# Patient Record
Sex: FEMALE | NOT HISPANIC OR LATINO | Employment: UNEMPLOYED | URBAN - METROPOLITAN AREA
[De-identification: names, ages, dates, MRNs, and addresses within clinical notes are randomized per-mention and may not be internally consistent; named-entity substitution may affect disease eponyms.]

---

## 2018-05-07 ENCOUNTER — TRANSCRIBE ORDERS (OUTPATIENT)
Dept: ADMINISTRATIVE | Facility: HOSPITAL | Age: 68
End: 2018-05-07

## 2018-05-07 DIAGNOSIS — Z12.31 VISIT FOR SCREENING MAMMOGRAM: Primary | ICD-10-CM

## 2018-05-24 PROBLEM — E11.9 TYPE 2 DIABETES MELLITUS (HCC): Status: ACTIVE | Noted: 2018-05-24

## 2018-05-24 PROBLEM — E66.9 CLASS 1 OBESITY IN ADULT: Status: ACTIVE | Noted: 2018-05-24

## 2018-05-24 PROBLEM — R94.31 ABNORMAL EKG: Status: ACTIVE | Noted: 2018-05-24

## 2018-06-01 ENCOUNTER — APPOINTMENT (OUTPATIENT)
Dept: MAMMOGRAPHY | Facility: HOSPITAL | Age: 68
End: 2018-06-01

## 2018-06-22 ENCOUNTER — APPOINTMENT (OUTPATIENT)
Dept: MAMMOGRAPHY | Facility: HOSPITAL | Age: 68
End: 2018-06-22

## 2019-02-27 ENCOUNTER — TRANSCRIBE ORDERS (OUTPATIENT)
Dept: ADMINISTRATIVE | Facility: HOSPITAL | Age: 69
End: 2019-02-27

## 2019-02-27 DIAGNOSIS — M81.0 SENILE OSTEOPOROSIS: Primary | ICD-10-CM

## 2019-04-16 ENCOUNTER — TRANSCRIBE ORDERS (OUTPATIENT)
Dept: ADMINISTRATIVE | Facility: HOSPITAL | Age: 69
End: 2019-04-16

## 2019-04-16 DIAGNOSIS — Z12.31 VISIT FOR SCREENING MAMMOGRAM: Primary | ICD-10-CM

## 2019-04-17 ENCOUNTER — APPOINTMENT (OUTPATIENT)
Dept: BONE DENSITY | Facility: HOSPITAL | Age: 69
End: 2019-04-17

## 2019-04-17 ENCOUNTER — APPOINTMENT (OUTPATIENT)
Dept: MAMMOGRAPHY | Facility: HOSPITAL | Age: 69
End: 2019-04-17

## 2020-07-10 ENCOUNTER — TELEPHONE (OUTPATIENT)
Dept: CARDIOLOGY | Facility: CLINIC | Age: 70
End: 2020-07-10

## 2020-08-10 PROCEDURE — U0003 INFECTIOUS AGENT DETECTION BY NUCLEIC ACID (DNA OR RNA); SEVERE ACUTE RESPIRATORY SYNDROME CORONAVIRUS 2 (SARS-COV-2) (CORONAVIRUS DISEASE [COVID-19]), AMPLIFIED PROBE TECHNIQUE, MAKING USE OF HIGH THROUGHPUT TECHNOLOGIES AS DESCRIBED BY CMS-2020-01-R: HCPCS | Performed by: NURSE PRACTITIONER

## 2020-08-15 ENCOUNTER — TELEPHONE (OUTPATIENT)
Dept: URGENT CARE | Facility: CLINIC | Age: 70
End: 2020-08-15

## 2020-08-15 NOTE — TELEPHONE ENCOUNTER
----- Message from MARK Reynoso sent at 8/13/2020  8:26 AM EDT -----  COVID not detected. Notify pt and have them f/u with PCP. Please discuss current [facility] COVID19 protocol instructions    ----- Message -----  From: Lab, Background User  Sent: 8/12/2020  10:07 PM EDT  To: [facility] Covid Results    Letter sent to pt with results.

## 2020-08-26 PROBLEM — E78.5 DYSLIPIDEMIA: Status: ACTIVE | Noted: 2020-08-26

## 2020-08-26 PROBLEM — I10 ESSENTIAL HYPERTENSION: Status: ACTIVE | Noted: 2020-08-26

## 2020-09-03 PROCEDURE — 93000 ELECTROCARDIOGRAM COMPLETE: CPT | Performed by: INTERNAL MEDICINE

## 2020-09-03 NOTE — PROGRESS NOTES
Rylee Tsai is a 70 y.o. female.  Primary Care: Edison Ann MD  Referring: Edison Ann MD  [address] [id number]      Chief Complaint   Patient presents with   • Hypertension     Patient Active Problem List    Diagnosis    1 MERIDA (dyspnea on exertion)    2 Palpitations    3 Abnormal EKG    4 Essential hypertension    5 Dyslipidemia    6 Diabetes mellitus (CMS/[facility])    7 Class 2 obesity in adult       History of Present Illness   This is a 70-year old female with past medical history of type 2 diabetes, intermittent hypertension but no known prior cardiac history.  She presents with a one-month history of decline in her exercise capacity and progressive dyspnea.  States that bending forwards and moderate exertion causes significant shortness of breath.  She has also been noticing associated palpitations described as racing of her heart.  States that for a few weeks she has not been able to find a comfortable position in the bed to sleep and sometimes has to prop herself up.  From her description it appears that she may be experiencing orthopnea.  She has also noted bilateral lower extremity and ankle edema intermittently for several months but this has worsened recently and has been more persistent.  The swelling does go down with leg elevation.  She reports a fall after tripping in the garage a few weeks ago and sustained blunt trauma to the right side without any major injuries.  She saw her PCP with these complaints and was prescribed atenolol which she took for 1 to 2 weeks and her palpitations improved however since then she has ran out of this medication.    She denies any chest pain but has occasionally felt a pressure and heaviness.  Denies any dizziness lightheadedness or syncope.  No recent fever chills abdominal pain nausea vomiting diarrhea constipation or urinary symptoms.  No symptoms of stroke.  All other review of systems are negative.      Summary of  record reviewed:  Primary care note dated 7/1/2020 was reviewed at which time she presented with multiple complaints.  Her blood pressure that day was 122/78 with a heart rate of 82 bpm.  She reported a fall 2 weeks prior with injury to the right arm.  She denied chest pain or shortness of breath.  Physical exam was remarkable for severe pain and limited range of motion of the right shoulder.  Cardiac exam was unremarkable.  She received counseling on the importance of her medications.  She was instructed return in 1 week for follow-up.    Past Surgical History:   Procedure Laterality Date   • CHOLECYSTECTOMY         The following portions of the patient's history were reviewed and updated as appropriate: allergies, current medications, past family history, past medical history, past social history, past surgical history and problem list.    No Known Allergies      Current Outpatient Medications:   •  atenolol (TENORMIN) 25 MG tablet, Take 25 mg by mouth Daily., Disp: , Rfl: NOT currently taking  •  cyclobenzaprine (FLEXERIL) 5 MG tablet, Take 5 mg by mouth 3 (Three) Times a Day As Needed for Muscle Spasms., Disp: , Rfl:   •  gabapentin (NEURONTIN) 100 MG capsule, Take 100 mg by mouth 3 (Three) Times a Day., Disp: , Rfl:   •  metFORMIN (GLUCOPHAGE) 500 MG tablet, Take 500 mg by mouth 2 (Two) Times a Day With Meals., Disp: , Rfl:   •  naproxen (NAPROSYN) 500 MG tablet, Take 500 mg by mouth 2 (Two) Times a Day With Meals., Disp: , Rfl:     Review of Systems   Constitution: Negative.   HENT: Negative.    Eyes: Negative.    Cardiovascular: Positive for dyspnea on exertion, leg swelling and palpitations. Negative for chest pain and syncope.   Respiratory: Negative.    Endocrine: Negative.    Hematologic/Lymphatic: Negative.    Skin: Negative.    Musculoskeletal: Negative.    Gastrointestinal: Negative.    Genitourinary: Negative.    Neurological: Negative.    Psychiatric/Behavioral: Negative.    Allergic/Immunologic:  "Negative.    All other systems reviewed and are negative.      Social History     Socioeconomic History   • Marital status:      Spouse name: Not on file   • Number of children: Not on file   • Years of education: Not on file   • Highest education level: Not on file   Tobacco Use   • Smoking status: Never Smoker   • Smokeless tobacco: Never Used   Substance and Sexual Activity   • Alcohol use: Never     Frequency: Never   • Drug use: Never   • Sexual activity: Defer       Family History   Problem Relation Age of Onset   • Heart disease Mother    • Heart failure Mother    • No Known Problems Father        Objective      /82 (BP Location: Left arm, Patient Position: Sitting)   Pulse 94   Ht 157.5 cm (62\")   Wt 89.8 kg (198 lb)   BMI 36.21 kg/m²     Physical Exam   Constitutional: She is oriented to person, place, and time. She appears well-developed and well-nourished. No distress.   HENT:   Head: Normocephalic and atraumatic.   Mouth/Throat: Oropharynx is clear and moist.   Eyes: Pupils are equal, round, and reactive to light. No scleral icterus.   Neck: Neck supple. No JVD present. No tracheal deviation present. No thyromegaly present.   Cardiovascular: Normal rate, regular rhythm and normal heart sounds. Exam reveals no gallop and no friction rub.   No murmur heard.  Pulmonary/Chest: Effort normal and breath sounds normal. No respiratory distress. She has no wheezes. She has no rales.   Abdominal: Soft. Bowel sounds are normal. She exhibits no distension and no mass. There is no tenderness. There is no rebound and no guarding.   Musculoskeletal: Normal range of motion. She exhibits edema. She exhibits no deformity.   Lymphadenopathy:     She has no cervical adenopathy.   Neurological: She is alert and oriented to person, place, and time. No cranial nerve deficit.   Skin: Skin is warm and dry. No rash noted. She is not diaphoretic.   Psychiatric: She has a normal mood and affect.   Nursing note and " vitals reviewed.        ECG 12 Lead  Date/Time: 9/3/2020 2:02 PM  Performed by: Ana Paula Vasquez MD  Authorized by: Ana Paula Vasquez MD   Previous ECG: no previous ECG available  Rhythm: sinus rhythm  Rate: normal  BPM: 88  Conduction: conduction normal  ST Segments: ST segments normal  T Waves: T waves normal  QRS axis: normal  Other: no other findings  Other findings: non-specific ST-T wave changes    Clinical impression: non-specific ECG            Lab Review:   From primary care dated 7/1/2020:  Fasting lipid panel: Total cholesterol 222, triglycerides 106, HDL 94,   Hemoglobin A1c: 6.6  TSH: 5.22         Assessment:   Diagnosis Plan   1. Palpitations most probably due to awareness of sinus tachycardia in the setting of deconditioning. Holter Monitor - 48 Hour  Add Toprol-XL 25 mg daily  Echocardiogram   2. MERIDA (dyspnea on exertion) with associated orthopnea and occasional chest pressure, suspect angina equivalent symptoms. ECG 12 Lead  Adult Transthoracic Echo Complete W/ Cont if Necessary Per Protocol  Stress Test With Myocardial Perfusion (1 Day)  Add Toprol-XL 25 mg daily  Add hydrochlorothiazide 12.5 mg daily   3. Dyslipidemia   managed by primary care   4. Essential hypertension  Add Toprol-XL 25 mg daily  Add hydrochlorothiazide 12.5 mg daily   5. Abnormal EKG  Stress Test With Myocardial Perfusion (1 Day)  Advised to take aspirin 81 mg daily      Plan:  Due to concerns about progressive angina and heart failure type symptoms of dyspnea and edema as well as symptoms of palpitations I have recommended echocardiogram, Cardiolite stress test and 48-hour Holter for further assessment.  We will start her on Toprol-XL 25 mg daily and hydrochlorothiazide 12.5 mg daily.  She was advised to continue aspirin 81 mg daily.  Follow-up in 1 month and further recommendations to be based on results of above studies.  Thank you for allowing us to participate in the care of your patient.     Scribed for Ana Paula Vasquez MD by  Electronically signed by Electronically signed by CONNER Judge, 09/04/20, 3:33 PM.    I, Ana Paula Vasquez MD, personally performed the services described in this documentation as scribed by the above named individual in my presence, and it is both accurate and complete.  9/4/2020  16:47      Addendum (October 13, 2020):  I was contacted by the patient's family.  She is doing much better and has moved back to [place] temporarily and may be traveling [place] for extended time.  Reported that her palpitations and exertional dyspnea has improved and would like prescription refill sent to the pharmacy in [place].  We discussed the findings of her cardiac monitor which did not show any significant arrhythmias.  I had recommended echocardiogram and stress test with which she was not able to schedule however I have recommended that she should at least have an echocardiogram through her physician in [place] for further assessment.  At their request I have refilled 90-day supply of Toprol-XL and HCTZ which was sent to [facility] pharmacy in [place].  Ana Paula Vasquez MD, FACC, [facility]

## 2020-09-04 ENCOUNTER — CONSULT (OUTPATIENT)
Dept: CARDIOLOGY | Facility: CLINIC | Age: 70
End: 2020-09-04

## 2020-09-04 VITALS
WEIGHT: 198 LBS | HEART RATE: 94 BPM | DIASTOLIC BLOOD PRESSURE: 82 MMHG | SYSTOLIC BLOOD PRESSURE: 132 MMHG | BODY MASS INDEX: 36.44 KG/M2 | HEIGHT: 62 IN

## 2020-09-04 DIAGNOSIS — R00.2 PALPITATIONS: Primary | ICD-10-CM

## 2020-09-04 DIAGNOSIS — R06.09 DOE (DYSPNEA ON EXERTION): ICD-10-CM

## 2020-09-04 DIAGNOSIS — R94.31 ABNORMAL EKG: ICD-10-CM

## 2020-09-04 DIAGNOSIS — E78.5 DYSLIPIDEMIA: ICD-10-CM

## 2020-09-04 DIAGNOSIS — I10 ESSENTIAL HYPERTENSION: ICD-10-CM

## 2020-09-04 PROCEDURE — 99204 OFFICE O/P NEW MOD 45 MIN: CPT | Performed by: INTERNAL MEDICINE

## 2020-09-04 RX ORDER — GABAPENTIN 100 MG/1
100 CAPSULE ORAL 3 TIMES DAILY
COMMUNITY

## 2020-09-04 RX ORDER — CYCLOBENZAPRINE HCL 5 MG
5 TABLET ORAL 3 TIMES DAILY PRN
COMMUNITY

## 2020-09-04 RX ORDER — HYDROCHLOROTHIAZIDE 12.5 MG/1
12.5 CAPSULE, GELATIN COATED ORAL DAILY
Qty: 30 CAPSULE | Refills: 11 | Status: SHIPPED | OUTPATIENT
Start: 2020-09-04 | End: 2020-10-13 | Stop reason: SDUPTHER

## 2020-09-04 RX ORDER — ATENOLOL 25 MG/1
25 TABLET ORAL DAILY
COMMUNITY
End: 2020-10-13 | Stop reason: ALTCHOICE

## 2020-09-04 RX ORDER — NAPROXEN 500 MG/1
500 TABLET ORAL 2 TIMES DAILY WITH MEALS
COMMUNITY

## 2020-09-04 RX ORDER — METOPROLOL SUCCINATE 25 MG/1
25 TABLET, EXTENDED RELEASE ORAL DAILY
Qty: 30 TABLET | Refills: 11 | Status: SHIPPED | OUTPATIENT
Start: 2020-09-04 | End: 2020-10-13 | Stop reason: SDUPTHER

## 2020-10-13 RX ORDER — METOPROLOL SUCCINATE 25 MG/1
25 TABLET, EXTENDED RELEASE ORAL DAILY
Qty: 90 TABLET | Refills: 3 | Status: SHIPPED | OUTPATIENT
Start: 2020-10-13

## 2020-10-13 RX ORDER — HYDROCHLOROTHIAZIDE 12.5 MG/1
12.5 CAPSULE, GELATIN COATED ORAL DAILY
Qty: 90 CAPSULE | Refills: 3 | Status: SHIPPED | OUTPATIENT
Start: 2020-10-13

## 2022-04-01 ENCOUNTER — TRANSCRIBE ORDERS (OUTPATIENT)
Dept: ADMINISTRATIVE | Facility: HOSPITAL | Age: 72
End: 2022-04-01

## 2022-04-01 DIAGNOSIS — Z12.31 VISIT FOR SCREENING MAMMOGRAM: Primary | ICD-10-CM
